# Patient Record
Sex: MALE | Race: WHITE | NOT HISPANIC OR LATINO | Employment: UNEMPLOYED | URBAN - NONMETROPOLITAN AREA
[De-identification: names, ages, dates, MRNs, and addresses within clinical notes are randomized per-mention and may not be internally consistent; named-entity substitution may affect disease eponyms.]

---

## 2017-03-07 ENCOUNTER — OFFICE VISIT (OUTPATIENT)
Dept: UROLOGY | Facility: CLINIC | Age: 77
End: 2017-03-07

## 2017-03-07 DIAGNOSIS — N50.819 ORCHALGIA: Primary | ICD-10-CM

## 2017-03-07 PROCEDURE — 99203 OFFICE O/P NEW LOW 30 MIN: CPT | Performed by: UROLOGY

## 2017-03-07 NOTE — PROGRESS NOTES
Chief Complaint:          Chief Complaint   Patient presents with   • Epididimytis       HPI:   77 y.o. male.    HPI   Pt had a mesh hernia repair in 8/16 He had pain in the right hemiscrotum since. He has had 2 courses of antibiotics.      Past Medical History:        Past Medical History   Diagnosis Date   • Colon polyp      HISTORY OF    • Dysphagia, oropharyngeal      PER H&P--RADHIKA   • Gout    • Hemorrhoids, internal    • Hypertension    • Inguinal hernia      RIGHT SIDE PER H&P   • Lung cancer          Current Meds:     Current Outpatient Prescriptions   Medication Sig Dispense Refill   • allopurinol (ZYLOPRIM) 300 MG tablet Take 300 mg by mouth daily.     • lisinopril (PRINIVIL,ZESTRIL) 10 MG tablet Take 10 mg by mouth daily.     • Multiple Vitamins-Minerals (CENTRUM SILVER PO) Take 1 tablet by mouth.       No current facility-administered medications for this visit.         Allergies:      No Known Allergies     Past Surgical History:     Past Surgical History   Procedure Laterality Date   • Shoulder surgery Bilateral    • Lung lobectomy Right      LOWER LOBECTOMY PER H&P--RADHIKA   • Appendectomy     • Inguinal hernia repair Right 8/16/2016     Procedure: INGUINAL HERNIA REPAIR;  Surgeon: Neville Dotson MD;  Location: [facility];  Service:          Social History:     Social History     Social History   • Marital status:      Spouse name: N/A   • Number of children: N/A   • Years of education: N/A     Occupational History   • Not on file.     Social History Main Topics   • Smoking status: Former Smoker     Types: Cigarettes     Quit date: 8/15/1980   • Smokeless tobacco: Never Used   • Alcohol use Yes      Comment: beer   • Drug use: No   • Sexual activity: Defer     Other Topics Concern   • Not on file     Social History Narrative       Family History:     History reviewed. No pertinent family history.    Review of Systems:     Review of Systems   Constitutional: Negative.    HENT: Negative.     Eyes: Negative.    Respiratory: Negative.    Cardiovascular: Negative.    Gastrointestinal: Negative.    Endocrine: Negative.    Genitourinary: Negative.    Musculoskeletal: Negative.    Skin: Negative.    Allergic/Immunologic: Negative.    Neurological: Negative.    Hematological: Negative.    Psychiatric/Behavioral: Negative.        Physical Exam:     Physical Exam    Procedure:     No notes on file      Assessment:     Encounter Diagnosis   Name Primary?   • Orchalgia Yes     No orders of the defined types were placed in this encounter.      Plan:   Will order a scrotal ultrasound and will see pt back in 1 week.    Counseling was given to patient for the following topics impressions. and the interim medical history and current results were reviewed.  A treatment plan with follow-up was made. Total time of the encounter was 35 minutes and 35 minutes were spent discussing Orchalgia [N50.819] face-to-face.        This document has been electronically signed by Rory Harry MD March 7, 2017 3:12 PM

## 2017-03-10 ENCOUNTER — HOSPITAL ENCOUNTER (OUTPATIENT)
Dept: ULTRASOUND IMAGING | Facility: HOSPITAL | Age: 77
Discharge: HOME | End: 2017-03-10
Attending: UROLOGY | Admitting: UROLOGY

## 2017-03-10 DIAGNOSIS — N50.819 ORCHALGIA: ICD-10-CM

## 2017-03-10 PROCEDURE — 76870 US EXAM SCROTUM: CPT

## 2017-03-10 PROCEDURE — 76870 US EXAM SCROTUM: CPT | Performed by: RADIOLOGY

## 2017-03-14 ENCOUNTER — OFFICE VISIT (OUTPATIENT)
Dept: UROLOGY | Facility: CLINIC | Age: 77
End: 2017-03-14

## 2017-03-14 DIAGNOSIS — N50.819 ORCHALGIA: Primary | ICD-10-CM

## 2017-03-14 PROCEDURE — 99213 OFFICE O/P EST LOW 20 MIN: CPT | Performed by: UROLOGY

## 2019-11-19 NOTE — PROGRESS NOTES
Chief Complaint:          Chief Complaint   Patient presents with   • Testicle Pain     1 week follow up to review ultrasound results.        HPI:   77 y.o. male.    HPI   Pt has had testicular pain on the right side.  Pt's scrotal ultrasound showed normal flow      Past Medical History:        Past Medical History   Diagnosis Date   • Colon polyp      HISTORY OF    • Dysphagia, oropharyngeal      PER H&P--RADHIKA   • Gout    • Hemorrhoids, internal    • Hypertension    • Inguinal hernia      RIGHT SIDE PER H&P   • Lung cancer          Current Meds:     Current Outpatient Prescriptions   Medication Sig Dispense Refill   • allopurinol (ZYLOPRIM) 300 MG tablet Take 300 mg by mouth daily.     • lisinopril (PRINIVIL,ZESTRIL) 10 MG tablet Take 10 mg by mouth daily.     • Multiple Vitamins-Minerals (CENTRUM SILVER PO) Take 1 tablet by mouth.       No current facility-administered medications for this visit.         Allergies:      No Known Allergies     Past Surgical History:     Past Surgical History   Procedure Laterality Date   • Shoulder surgery Bilateral    • Lung lobectomy Right      LOWER LOBECTOMY PER H&P--RADHIKA   • Appendectomy     • Inguinal hernia repair Right 8/16/2016     Procedure: INGUINAL HERNIA REPAIR;  Surgeon: Neville Dotson MD;  Location: [facility];  Service:          Social History:     Social History     Social History   • Marital status:      Spouse name: N/A   • Number of children: N/A   • Years of education: N/A     Occupational History   • Not on file.     Social History Main Topics   • Smoking status: Former Smoker     Types: Cigarettes     Quit date: 8/15/1980   • Smokeless tobacco: Never Used   • Alcohol use Yes      Comment: beer   • Drug use: No   • Sexual activity: Defer     Other Topics Concern   • Not on file     Social History Narrative       Family History:     History reviewed. No pertinent family history.    Review of Systems:     Review of Systems    Physical Exam:  See result note.  Lasix was decreased to 40mg daily due to decreased kidney function.  Dr. Benson would like a clinical visit to check fluid status when patient is in for echo on 12/2.  Patient is also scheduled to see PCP today due to complaints of dizziness.  Message held to review notes prior to calling patient to notify her of clinical visit.       Physical Exam    Procedure:     No notes on file      Assessment:     Encounter Diagnosis   Name Primary?   • Orchalgia Yes     No orders of the defined types were placed in this encounter.      Plan:   I discussed orchalgia and will keep an eye on pt and will see him back in 6 months    Counseling was given to patient for the following topics impressions. and the interim medical history and current results were reviewed.  A treatment plan with follow-up was made. Total time of the encounter was 22 minutes and 22 minutes were spent discussing Orchalgia [N50.819] face-to-face.       This document has been electronically signed by Rory Harry MD March 14, 2017 10:03 AM